# Patient Record
Sex: FEMALE | Race: WHITE | HISPANIC OR LATINO
[De-identification: names, ages, dates, MRNs, and addresses within clinical notes are randomized per-mention and may not be internally consistent; named-entity substitution may affect disease eponyms.]

---

## 2023-05-15 PROBLEM — Z00.00 ENCOUNTER FOR PREVENTIVE HEALTH EXAMINATION: Status: ACTIVE | Noted: 2023-05-15

## 2023-06-21 ENCOUNTER — APPOINTMENT (OUTPATIENT)
Dept: PULMONOLOGY | Facility: CLINIC | Age: 48
End: 2023-06-21
Payer: COMMERCIAL

## 2023-06-21 VITALS
WEIGHT: 138 LBS | HEIGHT: 60 IN | HEART RATE: 81 BPM | BODY MASS INDEX: 27.09 KG/M2 | TEMPERATURE: 98.5 F | SYSTOLIC BLOOD PRESSURE: 110 MMHG | OXYGEN SATURATION: 98 % | DIASTOLIC BLOOD PRESSURE: 70 MMHG

## 2023-06-21 DIAGNOSIS — D86.0 SARCOIDOSIS OF LUNG: ICD-10-CM

## 2023-06-21 DIAGNOSIS — K63.89 OTHER SPECIFIED DISEASES OF INTESTINE: ICD-10-CM

## 2023-06-21 PROCEDURE — 36415 COLL VENOUS BLD VENIPUNCTURE: CPT | Mod: GC

## 2023-06-21 PROCEDURE — 94060 EVALUATION OF WHEEZING: CPT

## 2023-06-21 PROCEDURE — 99244 OFF/OP CNSLTJ NEW/EST MOD 40: CPT | Mod: 25,GC

## 2023-06-21 PROCEDURE — 94727 GAS DIL/WSHOT DETER LNG VOL: CPT

## 2023-06-21 PROCEDURE — 94729 DIFFUSING CAPACITY: CPT

## 2023-06-22 ENCOUNTER — TRANSCRIPTION ENCOUNTER (OUTPATIENT)
Age: 48
End: 2023-06-22

## 2023-06-22 LAB
ALBUMIN SERPL ELPH-MCNC: 4.7 G/DL
ALP BLD-CCNC: 73 U/L
ALT SERPL-CCNC: 10 U/L
ANION GAP SERPL CALC-SCNC: 13 MMOL/L
AST SERPL-CCNC: 16 U/L
BILIRUB SERPL-MCNC: 0.8 MG/DL
BUN SERPL-MCNC: 19 MG/DL
CALCIUM SERPL-MCNC: 9.8 MG/DL
CHLORIDE SERPL-SCNC: 103 MMOL/L
CO2 SERPL-SCNC: 24 MMOL/L
CREAT SERPL-MCNC: 0.81 MG/DL
EGFR: 90 ML/MIN/1.73M2
GLUCOSE SERPL-MCNC: 98 MG/DL
POTASSIUM SERPL-SCNC: 4.4 MMOL/L
PROT SERPL-MCNC: 7.7 G/DL
SODIUM SERPL-SCNC: 140 MMOL/L

## 2023-06-23 LAB
M TB IFN-G BLD-IMP: NEGATIVE
QUANTIFERON TB PLUS MITOGEN MINUS NIL: 5.33 IU/ML
QUANTIFERON TB PLUS NIL: 0.01 IU/ML
QUANTIFERON TB PLUS TB1 MINUS NIL: 0 IU/ML
QUANTIFERON TB PLUS TB2 MINUS NIL: 0 IU/ML

## 2023-06-26 LAB — ACE BLD-CCNC: 41 U/L

## 2024-01-10 ENCOUNTER — TRANSCRIPTION ENCOUNTER (OUTPATIENT)
Age: 49
End: 2024-01-10

## 2024-03-27 ENCOUNTER — TRANSCRIPTION ENCOUNTER (OUTPATIENT)
Age: 49
End: 2024-03-27

## 2024-03-27 NOTE — REVIEW OF SYSTEMS
[Cough] : cough [Seasonal Allergies] : seasonal allergies [GERD] : gerd [Negative] : Dermatologic [Nasal Congestion] : no nasal congestion [Sinus Problems] : no sinus problems [Diarrhea] : no diarrhea [Headache] : no headache

## 2024-03-27 NOTE — ASSESSMENT
[FreeTextEntry1] : Data Reviewed:   Pathology report 4/19/2023 viewed from her portal: non caseating granulomas of stomach   PFT 6/21/23: ratio 81%, FEV1 91%, FVC 91%, no BD response, normal TLC 91%, DLCO normal 86%  Impression  Non caseating granulomas  R/O Sarcoid   Only pulmonary complaint is cough, but otherwise no objective evidence of parenchymal involvement; completely normal PFTs without restriction.  Chest X ray for parenchymal evaluation and CT chest if any abnormalities  ACE level, CBC and CMP to assess for additional abnormalities supporting sarcoid.  Quantiferon; low concern for TB given non caseating granulomas, but should be screened   Follow up after above completed for next steps  -- Attending Addendum  Seen and examined by me and agree w above. Interesting case, healthy nonsmoker with chronic cough and GERD, found to have noncaseating granulomas on endoscopic GI biopsy. This can be associated w GI tuberculosis (though would expect caseating granulomas) or Crohn's, or sarcoidosis. PFT is normal today. We will send some labs and get a CXR. -- Labs 6/2023: unremarkable CBC, CMP, ACE, quantiferon PA/lat CXR [facility] 6/2023 personally reviewed : clear lungs Spoke to her. CT chest exclude evidence pulmonary sarcoid. -- CT chest [facility] 2/2024 personally reviewed : clear, no evidence sarcoid / FMH w her

## 2024-03-27 NOTE — CONSULT LETTER
[Dear  ___] : Dear  [unfilled], [Courtesy Letter:] : I had the pleasure of seeing your patient, [unfilled], in my office today. [Please see my note below.] : Please see my note below. [Consult Closing:] : Thank you very much for allowing me to participate in the care of this patient.  If you have any questions, please do not hesitate to contact me. [Sincerely,] : Sincerely, [FreeTextEntry2] : Rodolfo Rojo MD\par [facility]\par [address] [facility]\par [facility], [address]\[place] [address]\par VIA FAX: [phone] [FreeTextEntry3] : Beth Rosenthal MD, FCCP\par

## 2024-03-27 NOTE — HISTORY OF PRESENT ILLNESS
[Never] : never [TextBox_4] : 6/21/23 [Shayani]: Referred by Dr. Rodolfo Rojo (GI, associated w/ [facility]) for cough. history of poorly controlled GERD - had endoscopy April 19th despite medical therapy (PPI for over a year) and biopsy from the stomach showed non necrotizing granuloma. Cough related to reflux, but previously used PRN albuterol in the spring associated with seasonal allergies. Never smoker. Rare drink once a month, no other toxic habits. Lives with , has dogs in the home. Allergic to ragweed but otherwise no triggers. Works for [employer], very sedentary. No personal or family history of lung issues. Extensive travel internationally, including to developing world.